# Patient Record
Sex: FEMALE | Race: WHITE
[De-identification: names, ages, dates, MRNs, and addresses within clinical notes are randomized per-mention and may not be internally consistent; named-entity substitution may affect disease eponyms.]

---

## 2021-08-16 ENCOUNTER — HOSPITAL ENCOUNTER (OUTPATIENT)
Dept: HOSPITAL 63 - SURG | Age: 21
Discharge: HOME | End: 2021-08-16
Attending: OTOLARYNGOLOGY
Payer: COMMERCIAL

## 2021-08-16 VITALS — DIASTOLIC BLOOD PRESSURE: 68 MMHG | SYSTOLIC BLOOD PRESSURE: 111 MMHG

## 2021-08-16 DIAGNOSIS — F17.210: ICD-10-CM

## 2021-08-16 DIAGNOSIS — Z20.822: ICD-10-CM

## 2021-08-16 DIAGNOSIS — J35.01: Primary | ICD-10-CM

## 2021-08-16 LAB — U PREG PATIENT: NEGATIVE

## 2021-08-16 PROCEDURE — 81025 URINE PREGNANCY TEST: CPT

## 2021-08-16 PROCEDURE — 42826 REMOVAL OF TONSILS: CPT

## 2021-08-16 PROCEDURE — U0003 INFECTIOUS AGENT DETECTION BY NUCLEIC ACID (DNA OR RNA); SEVERE ACUTE RESPIRATORY SYNDROME CORONAVIRUS 2 (SARS-COV-2) (CORONAVIRUS DISEASE [COVID-19]), AMPLIFIED PROBE TECHNIQUE, MAKING USE OF HIGH THROUGHPUT TECHNOLOGIES AS DESCRIBED BY CMS-2020-01-R: HCPCS

## 2021-08-16 PROCEDURE — C9803 HOPD COVID-19 SPEC COLLECT: HCPCS

## 2021-08-16 NOTE — OP
DATE OF SURGERY: 08/16/2021

PREOPERATIVE DIAGNOSIS:  Chronic tonsillitis.



POSTOPERATIVE DIAGNOSIS:  Chronic tonsillitis with hypertrophy.



PROCEDURE PERFORMED:  Tonsillectomy.



INDICATIONS:  Chronic infection and enlargement of the tonsils.



ANESTHESIA:  General anesthetic.



BLOOD LOSS: Approximately 20-25 mL.



DESCRIPTION OF PROCEDURE:  The patient was brought to the operating room.  She 

was transferred to the operating room table and placed in supine position.  

There, she was placed under general anesthetic and when her airway was secure 

and vital signs were all stable, the table was rotated 90 degrees.



Her mouth was braced open with a Jose Luis mouth gag and the tonsil tissue was 

observed to be abundant and crowding the airspace.  Using a tonsil needle, 1% 

lidocaine with epinephrine was injected into the tonsil tissue and the 

surrounding soft tissue.



The left tonsil was approached first.  It was grasped to a medial position with 

a straight Allis and then a shallow incision was created with a #12 knife blade 

and dissection was carried out with a Chetan dissector.  The Coblator was then 

applied and it facilitated tissue separation and bleeding controlled.  The 

tonsil was removed and bleeding controlled with the Coblator.  After the left 

tonsil was removed, the right tonsil was addressed.



The tonsil was then retracted medially and a shallow incision created. 

Retraction was accomplished with an Allis and the soft tissue was  

using a Chetan dissector. The Coblator was then used to remove the tonsil and to 

control bleeding.  When both tonsils were removed, the tonsil fossae were 

inspected.  There was no active bleeding occurring. 1% lidocaine with 

epinephrine was then injected into the soft tissue for postoperative comfort. 

The airway was then examined.  The nose was cleared and irrigated and the 

procedure was completed.  The patient was recovered from her anesthesia and 

taken to the recovery room in stable condition.







KAMAR

DR: Celeste   DD: 08/16/2021 09:11

DT: 08/16/2021 09:32   TID: [id number]

## 2021-08-18 NOTE — PATHOLOGY
[facility] Accession Number: [id number]

.                                                                01

Material submitted:                                        .

tonsil - BILATERAL TONSILS. Modifiers: bilateral

.                                                                01

Clinical history:                                          .

CHRONIC TONSILLITIS

TONSILLECTOMY

.                                                                02

**********************************************************************

Diagnosis:

Bilateral palatine tonsils, excision:

- Chronic hyperplastic tonsillitis, with focal presence of colonies of

Actinomyces and acute inflammation within tonsillar crypts.

(JPM:padmini; 08/17/2021)

S  08/17/2021  1436 Local

**********************************************************************

.                                                                02

Electronically signed:                                     .

Enrique Bautista MD, Pathologist

NPI- [id number]

.                                                                01

Gross description:                                         .

The specimen is received in formalin, labeled "Delisa Dailey, bilateral

tonsils".  Received are two palatine tonsils measuring 3.1 x 2.6 x 1.6 and

2.9 x 1.8 x 1.6 cm in greatest dimensions.  Sectioning reveals pink-tan,

homogenous cut surfaces throughout with typical crypts identified.  No

distinct nodules or lesions are noted grossly.  The specimen is submitted

representatively in cassette [id number].

([facility]; 8/16/2021)

NRI/NRI  08/16/2021  1753 Local

.                                                                02

Pathologist provided ICD-10:

J35.1, J35.01, J03.90

.                                                                02

CPT                                                        .

148734

Specimen Comment: A courtesy copy of this report has been sent to [phone]

Specimen Comment: Report sent to 

***Performed at:  01

   [facility]

   [address], [place]  [id number]

   MD John Dawkins MD Phone:  [phone]

***Performed at:  02

   [facility]

   [address] [place], [place]  [id number]

   MD Enrique Bautista MD Phone:  [phone]

## 2021-08-30 ENCOUNTER — HOSPITAL ENCOUNTER (EMERGENCY)
Dept: HOSPITAL 63 - ER | Age: 21
Discharge: HOME | End: 2021-08-30
Payer: COMMERCIAL

## 2021-08-30 VITALS — SYSTOLIC BLOOD PRESSURE: 150 MMHG | DIASTOLIC BLOOD PRESSURE: 97 MMHG

## 2021-08-30 VITALS — BODY MASS INDEX: 32.17 KG/M2 | HEIGHT: 61 IN | WEIGHT: 170.42 LBS

## 2021-08-30 DIAGNOSIS — K08.89: Primary | ICD-10-CM

## 2021-08-30 DIAGNOSIS — J02.0: ICD-10-CM

## 2021-08-30 PROCEDURE — 99284 EMERGENCY DEPT VISIT MOD MDM: CPT

## 2021-08-30 NOTE — PHYS DOC
Adult General


Chief Complaint


Chief Complaint:  DENTAL PROBLEM





HPI


HPI


Patient is a 21-year-old female who presents with dental pain.  States has been 

going on about a day and her top left, 7 out of 10, dull and achy in nature.  

Denies any recent traumas, illnesses, fevers, pain or trouble swallowing.  Den

ies any recent dental procedures.  States she did have her tonsils taken out 

about 3 weeks ago without complication.  States she has not seen a dentist.  

States she took some ibuprofen couple hours ago.





Review of Systems


Review of Systems


Review of systems otherwise unremarkable except noted in HPI





Allergies


Allergies





Allergies








Coded Allergies Type Severity Reaction Last Updated Verified


 


  No Known Drug Allergies    8/16/21 No











Physical Exam


Physical Exam





Constitutional: Well developed, well nourished, no acute distress, non-toxic 

appearance. []


HENT: Normocephalic, atraumatic, bilateral external ears normal, oropharynx 

moist, bilateral erythema with some bilateral  oral exudates, nose normal. []


Eyes: conjunctiva normal, no discharge. [] 


Neck: Normal range of motion, no tenderness, supple, no stridor, cervical 

lymphadenopathy. [] 


Neurologic: Alert and oriented X 3, normal motor function, normal sensory 

function, no focal deficits noted. []


Psychologic: Affect normal, judgement normal, mood normal. []





EKG


EKG


[]





Radiology/Procedures


Radiology/Procedures


[]





Heart Score


C/O Chest Pain:  No


Risk Factors:


Risk Factors:  DM, Current or recent (<one month) smoker, HTN, HLP, family 

history of CAD, obesity.


Risk Scores:


Risk Factors:  DM, Current or recent (<one month) smoker, HTN, HLP, family 

history of CAD, obesity.





Course & Med Decision Making


Course & Med Decision Making


Patient is a 21-year-old female presents with a chief complaint of dental pain


Vital signs not concerning.  Physical exam noted above.  Given oral pain 

medicine.  Offered dental block but patient declined.  Patient with [facility] 

criteria of 4 suggestive of strep throat.  Patient stated that as when she had 

her tonsils out because she had chronic strep throat.  Started on amoxicillin in

 the ED.


Given contact information for local free dentist and emergency dentist.  Advised

 to call in the morning to set up a follow-up to discuss need for filling versus

 root canal versus extraction.  Also advised to call primary care physician in 

the morning to update on ED visit and set up a follow-up as soon as possible.


Given recommendations for pain management at home.  Gave return precautions to 

the ED.  Patient grateful, verbalized understanding and agreed with plan of 

discharge.





[]





Dragon Disclaimer


Dragon Disclaimer


This electronic medical record was generated, in whole or in part, using a voice

 recognition dictation system.





Departure


Departure:


Impression:  


   Primary Impression:  


   Pain, dental


   Additional Impression:  


   Strep pharyngitis


Disposition:  01 HOME / SELF CARE / HOMELESS


Condition:  GOOD


Referrals:  


PCP,NO (PCP)








DONG TAN MD


Patient Instructions:  Dental Pain





Additional Instructions:  


Thank you for coming into the emergency department tonight and allowing us to 

take care of you.  Please read the attached information to go back over things 

we discussed.  You can begin a Tylenol, ibuprofen and Orajel regimen as 

discussed.  You were given contact information for local dentist and the 

emergency dentist, please call first thing in the morning to set up an 

appointment as soon as possible discussed the need for dental intervention 

including fillings, root canal versus extraction.  Please come back to the ED 

with new or concerning symptoms as discussed.


Scripts


Amoxicillin (AMOXICILLIN) 500 Mg Capsule


1 CAP PO BID for strep throat for 10 Days, #20 CAP


   Prov: HELGA BRUNSON MD         8/30/21





Problem Qualifiers











HELGA BRUNSON MD               Aug 30, 2021 02:57

## 2022-01-29 ENCOUNTER — HOSPITAL ENCOUNTER (EMERGENCY)
Dept: HOSPITAL 63 - ER | Age: 22
Discharge: HOME | End: 2022-01-29
Payer: COMMERCIAL

## 2022-01-29 VITALS
DIASTOLIC BLOOD PRESSURE: 91 MMHG | DIASTOLIC BLOOD PRESSURE: 91 MMHG | SYSTOLIC BLOOD PRESSURE: 138 MMHG | SYSTOLIC BLOOD PRESSURE: 138 MMHG

## 2022-01-29 VITALS — BODY MASS INDEX: 32.17 KG/M2 | WEIGHT: 170.42 LBS | HEIGHT: 61 IN

## 2022-01-29 DIAGNOSIS — R55: Primary | ICD-10-CM

## 2022-01-29 DIAGNOSIS — F11.10: ICD-10-CM

## 2022-01-29 PROCEDURE — 93005 ELECTROCARDIOGRAM TRACING: CPT

## 2022-01-29 PROCEDURE — 82947 ASSAY GLUCOSE BLOOD QUANT: CPT

## 2022-01-29 PROCEDURE — 99284 EMERGENCY DEPT VISIT MOD MDM: CPT

## 2022-01-29 NOTE — EKG
[facility] [address], [place] [address]

Test Date:    2022               Test Time:    00:41:51

Pat Name:     LUH UGARTE           Department:   

Patient ID:   [id number]           Room:          

Gender:       F                        Technician:   

:          2000               Requested By: BRENNEN HAND

Order Number: [id number]            Reading MD:   David Murphy

                                 Measurements

Intervals                              Axis          

Rate:         77                       P:            64

SC:           140                      QRS:          21

QRSD:         84                       T:            16

QT:           370                                    

QTc:          420                                    

                           Interpretive Statements

SINUS RHYTHM

LEFT ATRIAL ABNORMALITY

ABNORMAL ECG

RI6.02

No previous ECG available for comparison

Electronically Signed On 2022 9:24:18 CST by David Murphy

## 2022-01-29 NOTE — PHYS DOC
Past History


Past Surgical History:  Tonsillectomy


Drug Use:  Opiates





General Adult


EDM:


Chief Complaint:  OVERDOSE





HPI:


HPI:


",, I was snoting some street Percocet... And I guess I passed out.. "





Patient is a 21 year old female who presents with hx of syncope after snorting 

street Percocet narcotic.   Patient denies any travel.  No specific ill 

contacts.  Denies any immunosuppression.  Has had COVID vaccination x1.  Has had

flu vaccination for this season.  Patient states is not the first time she tried

illicit drugs . Pt. states she usually knows her body and tolerance to the 

drugs.  Patient has not follow-up with primary care.





Review of Systems:


Review of Systems:


Constitutional:  Denies fever or chills 


Eyes:  Denies change in visual acuity 


HENT:  Denies nasal congestion or sore throat 


Respiratory:  Denies cough or shortness of breath 


Cardiovascular:  Denies chest pain or edema 


GI:  Denies abdominal pain, nausea, vomiting, bloody stools or diarrhea 


: Denies dysuria 


Musculoskeletal:  Denies back pain or joint pain 


Integument:  Denies rash 


Neurologic:  Denies headache, focal weakness or sensory changes.  Complains of 

syncope after snorting street Percocet


Endocrine:  Denies polyuria or polydipsia 


Lymphatic:  Denies swollen glands 


Psychiatric:  Denies depression or anxiety





Family History:


Family History:


Noncontributory





Current Medications:


Current Meds:


See nursing for home meds





Allergies:


Allergies:





Allergies








Coded Allergies Type Severity Reaction Last Updated Verified


 


  No Known Drug Allergies    8/16/21 No











Physical Exam:


PE:





Constitutional:, no acute distress, non-toxic appearance. []


HENT: Normocephalic, atraumatic, bilateral external ears normal, oropharynx 

moist, no oral exudates, nose injected turbinates..  Nose ring and nose studs


Eyes: PERRLA, EOMI, conjunctiva normal, no discharge. [] 


Neck: Normal range of motion, no tenderness, supple, no stridor. [] 


Cardiovascular:Heart rate regular rhythm, no murmur []


Lungs & Thorax:  Bilateral breath sounds clear to auscultation []


Abdomen: Bowel sounds normal, soft, no tenderness, no masses, no pulsatile 

masses. [] 


Skin: Warm, dry, no erythema, no rash.  Eczema on left forearm.  Tattoo's


Back: No tenderness, no CVA tenderness. [] 


Extremities: No tenderness, no cyanosis, no clubbing, ROM intact, no edema.  

DTRs +2 patella brachial.


Neurologic: Alert and oriented X 3, normal motor function, normal sensory 

function, no focal deficits noted. []


Psychologic: Affect tearful, judgement normal, mood normal. []





EKG:


EKG:


My interpretation of EKG shows a sinus rhythm at 77 bpm.  Does have some bimodal

 P waves in left leads.  But no findings acute STEMI of contralateral changes.  

Time of EKG is 00: 41 hours [].





Radiology/Procedures:


Radiology/Procedures:


[]





Heart Score:


C/O Chest Pain:  N/A


Risk Factors:


Risk Factors:  DM, Current or recent (<one month) smoker, HTN, HLP, family 

history of CAD, obesity.


Risk Scores:


Score 0 - 3:  2.5% MACE over next 6 weeks - Discharge Home


Score 4 - 6:  20.3% MACE over next 6 weeks - Admit for Clinical Observation


Score 7 - 10:  72.7% MACE over next 6 weeks - Early Invasive Strategies





Course & Med Decision Making:


Course & Med Decision Making


Pertinent Labs and Imaging studies reviewed. (See chart for details)





Avoid further illicit drug use.  Consider follow up with [facility]   [phone].








Impression:





1.  Drug Abuse


2.  Syncope after recreational drug use





[]





Dragon Disclaimer:


Dragon Disclaimer:


This electronic medical record was generated, in whole or in part, using a voice

 recognition dictation system.





Departure


Departure:


Referrals:  


PCP,NO (PCP)





Dragon Disclaimer


This chart was dictated in whole or in part using Voice Recognition software in 

a busy, high-work load, and often noisy Emergency Department environment.  It 

may contain unintended and wholly unrecognized errors or omissions.











BRENNEN HAND MD           Jan 29, 2022 00:42

## 2022-04-21 ENCOUNTER — HOSPITAL ENCOUNTER (EMERGENCY)
Dept: HOSPITAL 63 - ER | Age: 22
Discharge: HOME | End: 2022-04-21
Payer: COMMERCIAL

## 2022-04-21 ENCOUNTER — HOSPITAL ENCOUNTER (OUTPATIENT)
Dept: HOSPITAL 63 - LAB | Age: 22
End: 2022-04-21
Payer: COMMERCIAL

## 2022-04-21 VITALS
BODY MASS INDEX: 32.38 KG/M2 | SYSTOLIC BLOOD PRESSURE: 131 MMHG | WEIGHT: 171.52 LBS | HEIGHT: 61 IN | DIASTOLIC BLOOD PRESSURE: 89 MMHG

## 2022-04-21 DIAGNOSIS — Z3A.00: ICD-10-CM

## 2022-04-21 DIAGNOSIS — Z02.83: Primary | ICD-10-CM

## 2022-04-21 DIAGNOSIS — O26.891: Primary | ICD-10-CM

## 2022-04-21 PROCEDURE — 36415 COLL VENOUS BLD VENIPUNCTURE: CPT

## 2022-04-21 PROCEDURE — 81025 URINE PREGNANCY TEST: CPT

## 2022-04-21 PROCEDURE — 99283 EMERGENCY DEPT VISIT LOW MDM: CPT

## 2022-04-21 PROCEDURE — 93005 ELECTROCARDIOGRAM TRACING: CPT

## 2022-04-21 NOTE — PHYS DOC
Past History


Past Surgical History:  Tonsillectomy


Alcohol Use:  Occasionally


Drug Use:  Opiates





General Adult


EDM:


Chief Complaint:  OVERDOSE





HPI:


HPI:


Patient is a 22-year-old female who is in PD custody who presents to the emerg

ency department today for medical clearance for incarceration.  Patient 

apparently had an overdose on Percocets was found outside of [address] [place] [address] and she 

received 2 doses of Narcan with the second 1 being at 1910.  Patient immediately

woke up.  She is alert and oriented x4 and answering all questions 

appropriately.  Patient has no complaints at this time.  She denies abdominal 

pain, nausea, vomiting, chest pain, shortness of breath, vaginal bleeding.  

Patient reports that she did have a positive pregnancy test at her doctor's 

office and has an appointment with [facility].  Patient reports that her

last menstrual period was .  She is .





Review of Systems:


Review of Systems:





Respiratory: See HPI


Cardiovascular: See HPI


GI:  See HPI


: See HPI


Neurologic:  See HPI





Allergies:


Allergies:





Allergies








Coded Allergies Type Severity Reaction Last Updated Verified


 


  No Known Drug Allergies    22 No











Physical Exam:


PE:





Constitutional: Well developed, well nourished, no acute distress, non-toxic 

appearance. []


HENT: Normocephalic, atraumatic, bilateral external ears normal, oropharynx 

moist, no oral exudates, nose normal. []


Eyes: PERRL, EOMI, conjunctiva normal, no discharge. [] 


Neck: Normal range of motion, no tenderness, supple, no stridor. [] 


Cardiovascular:Heart rate tachycardic rhythm, no murmur []


Lungs & Thorax:  Bilateral breath sounds clear to auscultation []


Abdomen: soft and flat


Skin: Warm, dry, no erythema, no rash. [] 


Back: No tenderness, normal ROM


Extremities: No tenderness, no cyanosis, no clubbing, ROM intact, no edema. [] 


Neurologic: Alert and oriented X 3, normal motor function, normal sensory 

function, no focal deficits noted. []


Psychologic: Affect normal, judgement normal, mood normal. []





Current Patient Data:


Vital Signs:





                                   Vital Signs








  Date Time  Temp Pulse Resp B/P (MAP) Pulse Ox O2 Delivery O2 Flow Rate FiO2


 


22 20:29 97.7 109 20  100   











EKG:


EKG:


EKG performed by ER staff at  shows sinus tach cardia with a rate of 108, 

QTc is 441, no STEMI read by Dr. Matthews at  []





Radiology/Procedures:


Radiology/Procedures:


[]





Heart Score:


C/O Chest Pain:  No


Risk Factors:


Risk Factors:  DM, Current or recent (<one month) smoker, HTN, HLP, family 

history of CAD, obesity.


Risk Scores:


Score 0 - 3:  2.5% MACE over next 6 weeks - Discharge Home


Score 4 - 6:  20.3% MACE over next 6 weeks - Admit for Clinical Observation


Score 7 - 10:  72.7% MACE over next 6 weeks - Early Invasive Strategies





Course & Med Decision Making:


Course & Med Decision Making


Pertinent Labs and Imaging studies reviewed. (See chart for details)





[] Patient presents to the emergency department for medical clearance for 

incarceration after she overdosed on opiates.  Patient has no complaints at this

 time. Patient received narcan >90 minutes ago. Patient alert and oriented x4. 

Patient to be discharged to PD custody.  I discussed with patient all findings 

and diagnostic testing as well as the need to follow-up with PCP for further 

evaluation and treatment or return to the ER if any new or worsening symptoms.  

Strict return precautions were also discussed at length.  Patient voiced 

understanding and agreement with the plan.  Patient is hemodynamically stable at

 the time of disposition.





Dragon Disclaimer:


Dragon Disclaimer:


This electronic medical record was generated, in whole or in part, using a voice

 recognition dictation system.





Departure


Departure:


Impression:  


   Primary Impression:  


   Medical clearance for incarceration


Disposition:   HOME / SELF CARE / HOMELESS


Condition:  GOOD


Referrals:  


PCP,NO (PCP)


Patient Instructions:  Opiate Dependence





Additional Instructions:  


You were seen in the emergency department following an overdose.  You are alert 

and oriented had no complaints.  Please follow-up with your primary care 

provider as needed.











RICKY MARTINEZ          2022 20:48

## 2022-04-22 NOTE — EKG
[facility] [address], [place] [address]

Test Date:    2022               Test Time:    20:52:45

Pat Name:     LUH UGARTE           Department:   

Patient ID:   [id number]           Room:          

Gender:       F                        Technician:   ERICK

:          2000               Requested By: RICKY MARTINEZ

Order Number: [id number]            Reading MD:     

                                 Measurements

Intervals                              Axis          

Rate:         108                      P:            48

DE:           144                      QRS:          43

QRSD:         86                       T:            62

QT:           326                                    

QTc:          441                                    

                           Interpretive Statements

SINUS TACHYCARDIA

QRS(T) CONTOUR ABNORMALITY

CONSIDER ANTEROLATERAL MYOCARDIAL DAMAGE

POSSIBLY ABNORMAL ECG

RI6.01

No previous ECG available for comparison